# Patient Record
Sex: FEMALE | Race: WHITE | NOT HISPANIC OR LATINO | Employment: UNEMPLOYED | URBAN - NONMETROPOLITAN AREA
[De-identification: names, ages, dates, MRNs, and addresses within clinical notes are randomized per-mention and may not be internally consistent; named-entity substitution may affect disease eponyms.]

---

## 2021-07-12 ENCOUNTER — HOSPITAL ENCOUNTER (EMERGENCY)
Facility: HOSPITAL | Age: 26
Discharge: HOME | End: 2021-07-12
Attending: EMERGENCY MEDICINE | Admitting: EMERGENCY MEDICINE
Payer: COMMERCIAL

## 2021-07-12 ENCOUNTER — APPOINTMENT (EMERGENCY)
Dept: RADIOLOGY | Facility: HOSPITAL | Age: 26
End: 2021-07-12
Payer: COMMERCIAL

## 2021-07-12 VITALS
SYSTOLIC BLOOD PRESSURE: 109 MMHG | WEIGHT: 145.94 LBS | OXYGEN SATURATION: 98 % | RESPIRATION RATE: 18 BRPM | BODY MASS INDEX: 23.46 KG/M2 | HEART RATE: 74 BPM | DIASTOLIC BLOOD PRESSURE: 65 MMHG | TEMPERATURE: 98 F | HEIGHT: 66 IN

## 2021-07-12 DIAGNOSIS — F45.8 ANXIETY HYPERVENTILATION: Primary | ICD-10-CM

## 2021-07-12 DIAGNOSIS — F41.9 ANXIETY HYPERVENTILATION: Primary | ICD-10-CM

## 2021-07-12 DIAGNOSIS — R00.2 PALPITATIONS: ICD-10-CM

## 2021-07-12 LAB
ATRIAL RATE: 81 BPM
P AXIS: 75 DEGREES
PR INTERVAL: 120 MS
QRS AXIS: 100 DEGREES
QRSD INTERVAL: 96 MS
QT INTERVAL: 372 MS
QTC INTERVAL: 432 MS
T WAVE AXIS: 70 DEGREES
VENTRICULAR RATE: 81 BPM

## 2021-07-12 PROCEDURE — 93005 ELECTROCARDIOGRAM TRACING: CPT

## 2021-07-12 PROCEDURE — 99285 EMERGENCY DEPT VISIT HI MDM: CPT

## 2021-07-12 PROCEDURE — 71045 X-RAY EXAM CHEST 1 VIEW: CPT

## 2021-07-12 PROCEDURE — 99284 EMERGENCY DEPT VISIT MOD MDM: CPT | Performed by: EMERGENCY MEDICINE

## 2021-07-12 RX ORDER — ALPRAZOLAM 0.5 MG/1
0.5 TABLET ORAL 3 TIMES DAILY PRN
Qty: 15 TABLET | Refills: 0 | Status: SHIPPED | OUTPATIENT
Start: 2021-07-12

## 2021-07-12 RX ORDER — ALPRAZOLAM 0.5 MG/1
0.5 TABLET ORAL ONCE
Status: COMPLETED | OUTPATIENT
Start: 2021-07-12 | End: 2021-07-12

## 2021-07-12 RX ORDER — NORETHINDRONE ACETATE AND ETHINYL ESTRADIOL 1MG-20(21)
1 KIT ORAL DAILY
COMMUNITY
Start: 2021-03-15 | End: 2022-03-15

## 2021-07-12 RX ADMIN — ALPRAZOLAM 0.5 MG: 0.5 TABLET ORAL at 01:24

## 2021-07-12 NOTE — ED PROVIDER NOTES
History  Chief Complaint   Patient presents with    Palpitations     Patient reports palpitations/SOB waking her from sleep  Also reports feeling anxious  Patient is a 82-year-old female presents emergency department complaining of shortness of breath and palpitations which she woke from sleep with she reports this happened to her last night as well she woke up feeling short of breath and having heart racing and feeling very anxious patient does have a history of anxiety she believes she may be having a panic attack but does not have prior history of panic attacks  Patient is very anxious and tearful  History provided by:  Patient and parent  Palpitations  Palpitations quality:  Fast  Onset quality:  Sudden  Duration:  1 hour  Timing:  Constant  Progression:  Worsening  Chronicity:  Recurrent  Context: anxiety    Associated symptoms: shortness of breath    Associated symptoms: no chest pain, no cough, no dizziness, no nausea, no numbness, no vomiting and no weakness        Prior to Admission Medications   Prescriptions Last Dose Informant Patient Reported? Taking?   norethindrone-ethinyl estradiol (JUNEL FE 1/20) 1-20 MG-MCG per tablet 7/11/2021 at Unknown time  Yes Yes   Sig: Take 1 tablet by mouth daily      Facility-Administered Medications: None       History reviewed  No pertinent past medical history  History reviewed  No pertinent surgical history  History reviewed  No pertinent family history  I have reviewed and agree with the history as documented  E-Cigarette/Vaping    E-Cigarette Use Never User      E-Cigarette/Vaping Substances     Social History     Tobacco Use    Smoking status: Never Smoker    Smokeless tobacco: Never Used   Vaping Use    Vaping Use: Never used   Substance Use Topics    Alcohol use: Not Currently     Comment: Social    Drug use: Never       Review of Systems   Constitutional: Negative for activity change, appetite change, chills, fatigue and fever  HENT: Negative for congestion, ear pain, rhinorrhea and sore throat  Eyes: Negative for discharge, redness and visual disturbance  Respiratory: Positive for shortness of breath  Negative for cough, chest tightness and wheezing  Cardiovascular: Positive for palpitations  Negative for chest pain  Gastrointestinal: Negative for abdominal pain, constipation, diarrhea, nausea and vomiting  Endocrine: Negative for polydipsia and polyuria  Genitourinary: Negative for difficulty urinating, dysuria, frequency, hematuria and urgency  Musculoskeletal: Negative for arthralgias and myalgias  Skin: Negative for color change, pallor and rash  Neurological: Negative for dizziness, weakness, light-headedness, numbness and headaches  Hematological: Negative for adenopathy  Does not bruise/bleed easily  Psychiatric/Behavioral: The patient is nervous/anxious  All other systems reviewed and are negative  Physical Exam  Physical Exam  Vitals and nursing note reviewed  Constitutional:       Appearance: She is well-developed  HENT:      Head: Normocephalic and atraumatic  Right Ear: External ear normal       Left Ear: External ear normal       Nose: Nose normal    Eyes:      Conjunctiva/sclera: Conjunctivae normal       Pupils: Pupils are equal, round, and reactive to light  Cardiovascular:      Rate and Rhythm: Normal rate and regular rhythm  Heart sounds: Normal heart sounds  Pulmonary:      Effort: Pulmonary effort is normal  No respiratory distress  Breath sounds: Normal breath sounds  No wheezing or rales  Chest:      Chest wall: No tenderness  Abdominal:      General: Bowel sounds are normal  There is no distension  Palpations: Abdomen is soft  Tenderness: There is no abdominal tenderness  There is no guarding  Musculoskeletal:         General: Normal range of motion  Cervical back: Normal range of motion and neck supple     Skin:     General: Skin is warm and dry  Neurological:      Mental Status: She is alert and oriented to person, place, and time  Cranial Nerves: No cranial nerve deficit  Sensory: No sensory deficit  Vital Signs  ED Triage Vitals [07/12/21 0115]   Temperature Pulse Respirations Blood Pressure SpO2   98 °F (36 7 °C) 94 20 148/90 100 %      Temp Source Heart Rate Source Patient Position - Orthostatic VS BP Location FiO2 (%)   Temporal Monitor Lying Right arm --      Pain Score       --           Vitals:    07/12/21 0115 07/12/21 0130 07/12/21 0200   BP: 148/90 122/75 109/65   Pulse: 94 79 74   Patient Position - Orthostatic VS: Lying           Visual Acuity      ED Medications  Medications   ALPRAZolam (XANAX) tablet 0 5 mg (0 5 mg Oral Given 7/12/21 0124)       Diagnostic Studies  Results Reviewed     None                 XR chest 1 view portable   ED Interpretation by Kaitlynn Whitaker DO (07/12 0151)   No acute cardiopulmonary disease                 Procedures  ECG 12 Lead Documentation Only    Date/Time: 7/12/2021 1:29 AM  Performed by: Kaitlynn Whitaker DO  Authorized by:  Kaitlynn Whitaker DO     ECG reviewed by me, the ED Provider: yes    Patient location:  ED  Previous ECG:     Comparison to cardiac monitor: Yes    Quality:     Tracing quality:  Limited by artifact  Rate:     ECG rate:  81    ECG rate assessment: normal    Rhythm:     Rhythm: sinus rhythm    QRS:     QRS axis:  Right    QRS intervals:  Normal  Conduction:     Conduction: normal    ST segments:     ST segments:  Normal  T waves:     T waves: normal               ED Course                     PERC Rule for PE      Most Recent Value   PERC Rule for PE   Age >=50  0 Filed at: 07/12/2021 0129   HR >=100  0 Filed at: 07/12/2021 0129   O2 Sat on room air < 95%  0 Filed at: 07/12/2021 0129   History of PE or DVT  0 Filed at: 07/12/2021 0129   Recent trauma or surgery  0 Filed at: 07/12/2021 0129   Hemoptysis  0 Filed at: 07/12/2021 0129   Exogenous estrogen  0 Filed at: 07/12/2021 0129   Unilateral leg swelling  0 Filed at: 07/12/2021 0129   PERC Rule for PE Results  0 Filed at: 07/12/2021 5435                  Wells' Criteria for PE      Most Recent Value   Wells' Criteria for PE   Clinical signs and symptoms of DVT  0 Filed at: 07/12/2021 0604   PE is primary diagnosis or equally likely  0 Filed at: 07/12/2021 0129   HR >100  0 Filed at: 07/12/2021 0129   Immobilization at least 3 days or Surgery in the previous 4 weeks  0 Filed at: 07/12/2021 0129   Previous, objectively diagnosed PE or DVT  0 Filed at: 07/12/2021 0129   Hemoptysis  0 Filed at: 07/12/2021 0129   Malignancy with treatment within 6 months or palliative  0 Filed at: 07/12/2021 6294   Wells' Criteria Total  0 Filed at: 07/12/2021 0129                MDM  Number of Diagnoses or Management Options  Anxiety hyperventilation: new and requires workup  Palpitations: new and requires workup  Diagnosis management comments: Patient feels improved after rest and Xanax given in the emergency department she remained clinically and hemodynamically stable chest x-ray and EKG are unremarkable symptoms consistent with acute anxiety hyperventilation discussed measures to cope with anxiety and reduced panic attacks offered prescription for Xanax as needed for severe anxiety and panic in the future and discussed risks and precautions with this and how and when to use this medication  Advised prompt follow-up with primary physician for further evaluation and treatment return precautions and anticipatory guidance discussed           Amount and/or Complexity of Data Reviewed  Tests in the radiology section of CPT®: ordered and reviewed  Tests in the medicine section of CPT®: reviewed and ordered  Decide to obtain previous medical records or to obtain history from someone other than the patient: yes  Review and summarize past medical records: yes  Independent visualization of images, tracings, or specimens: yes    Risk of Complications, Morbidity, and/or Mortality  Presenting problems: low  Diagnostic procedures: low  Management options: low    Patient Progress  Patient progress: stable      Disposition  Final diagnoses:   Anxiety hyperventilation   Palpitations     Time reflects when diagnosis was documented in both MDM as applicable and the Disposition within this note     Time User Action Codes Description Comment    7/12/2021  2:10 AM Daryel [place] Add [F41 9,  F45 8] Anxiety hyperventilation     7/12/2021  2:10 AM Daryel [place] Add [R00 2] Palpitations       ED Disposition     ED Disposition Condition Date/Time Comment    Discharge Stable Mon Jul 12, 2021  2:10 AM Elvia Pittman discharge to home/self care  Follow-up Information     Follow up With Specialties Details Why Contact Info      Schedule an appointment as soon as possible for a visit in 3 days  Your primary care physician          Patient's Medications   Discharge Prescriptions    ALPRAZOLAM (XANAX) 0 5 MG TABLET    Take 1 tablet (0 5 mg total) by mouth 3 (three) times a day as needed for anxiety or sleep for up to 15 doses       Start Date: 7/12/2021 End Date: --       Order Dose: 0 5 mg       Quantity: 15 tablet    Refills: 0     No discharge procedures on file      PDMP Review     None          ED Provider  Electronically Signed by           Isidroleanne Miller DO  07/12/21 9045

## 2023-04-25 ENCOUNTER — OFFICE VISIT (OUTPATIENT)
Dept: URGENT CARE | Facility: CLINIC | Age: 28
End: 2023-04-25

## 2023-04-25 VITALS
HEIGHT: 65 IN | SYSTOLIC BLOOD PRESSURE: 128 MMHG | TEMPERATURE: 97.8 F | HEART RATE: 65 BPM | DIASTOLIC BLOOD PRESSURE: 70 MMHG | BODY MASS INDEX: 23.32 KG/M2 | OXYGEN SATURATION: 100 % | WEIGHT: 140 LBS | RESPIRATION RATE: 18 BRPM

## 2023-04-25 DIAGNOSIS — J02.9 VIRAL PHARYNGITIS: Primary | ICD-10-CM

## 2023-04-25 LAB — S PYO AG THROAT QL: NEGATIVE

## 2023-04-25 RX ORDER — DROSPIRENONE AND ETHINYL ESTRADIOL 0.02-3(28)
1 KIT ORAL DAILY
COMMUNITY
Start: 2023-01-06 | End: 2024-01-06

## 2023-04-25 NOTE — PROGRESS NOTES
[facility]        NAME: Yesica Benedict is a 24 y o  female  : 1995    MRN: [id number]  DATE: 2023  TIME: 10:31 AM    Assessment and Plan   Viral pharyngitis [J02 9]  1  Viral pharyngitis  POCT rapid strepA    Throat culture            Patient Instructions   Drink plenty of fluids  May use over the counter cold medications for symptomatic treatment  Do not use medications with Pseudoephedrine or Phenylphrine if you have high blood pressure because it may worsen your blood pressure  Follow up with your PCP in 3-5 days if your symptoms do not improve or if you have any concerns  Go to the ER if symptoms become severe  Follow up with PCP in 3-5 days  Proceed to  ER if symptoms worsen  Chief Complaint     Chief Complaint   Patient presents with   • Sore Throat     Sore throat, cough, swollen lymph nodes x 1 day          History of Present Illness       Patient is 22-year-old female with no severe past medical history presents the office complaining of sore throat, swollen lymph nodes, and cough for 1 day  Pain is rated 7 out of 10  Denies fevers, chills, congestion, chest pain, shortness breath, nausea, vomiting, abdominal pain, or rashes  Reports possible exposure to strep last week  Review of Systems   Review of Systems   Constitutional: Negative for chills and fever  HENT: Positive for sore throat  Negative for congestion  Respiratory: Positive for cough  Negative for shortness of breath  Cardiovascular: Negative for chest pain and palpitations  Gastrointestinal: Negative for abdominal pain, diarrhea, nausea and vomiting  Skin: Negative for rash  Neurological: Negative for dizziness, light-headedness and headaches           Current Medications       Current Outpatient Medications:   •  ALPRAZolam (XANAX) 0 5 mg tablet, Take 1 tablet (0 5 mg total) by mouth 3 (three) times a day as needed for anxiety or sleep for up to 15 doses, Disp: 15 tablet, Rfl: 0  • "drospirenone-ethinyl estradiol (ALEX) 3-0 02 MG per tablet, Take 1 tablet by mouth daily, Disp: , Rfl:   •  norethindrone-ethinyl estradiol (JUNEL FE 1/20) 1-20 MG-MCG per tablet, Take 1 tablet by mouth daily, Disp: , Rfl:     Current Allergies     Allergies as of 04/25/2023   • (No Known Allergies)            The following portions of the patient's history were reviewed and updated as appropriate: allergies, current medications, past family history, past medical history, past social history, past surgical history and problem list      Past Medical History:   Diagnosis Date   • Anxiety        Past Surgical History:   Procedure Laterality Date   • NO PAST SURGERIES         History reviewed  No pertinent family history  Medications have been verified  Objective   /70   Pulse 65   Temp 97 8 °F (36 6 °C) (Tympanic)   Resp 18   Ht 5' 5\" (1 651 m)   Wt 63 5 kg (140 lb)   LMP 03/25/2023   SpO2 100%   BMI 23 30 kg/m²   Patient's last menstrual period was 03/25/2023  Physical Exam     Physical Exam  Vitals and nursing note reviewed  Constitutional:       Appearance: Normal appearance  She is well-developed  HENT:      Head: Normocephalic and atraumatic  Right Ear: Tympanic membrane, ear canal and external ear normal       Left Ear: Tympanic membrane, ear canal and external ear normal       Nose: Nose normal       Mouth/Throat:      Mouth: Mucous membranes are moist       Pharynx: Oropharynx is clear  Uvula midline  No pharyngeal swelling or posterior oropharyngeal erythema  Tonsils: No tonsillar exudate or tonsillar abscesses  Eyes:      General: Lids are normal       Conjunctiva/sclera: Conjunctivae normal       Pupils: Pupils are equal, round, and reactive to light  Cardiovascular:      Rate and Rhythm: Normal rate and regular rhythm  Pulses: Normal pulses  Heart sounds: Normal heart sounds  No murmur heard  No friction rub  No gallop     Pulmonary:      Effort: " Pulmonary effort is normal       Breath sounds: Normal breath sounds  No wheezing, rhonchi or rales  Musculoskeletal:         General: Normal range of motion  Cervical back: Neck supple  Lymphadenopathy:      Cervical: Cervical adenopathy present  Skin:     General: Skin is warm and dry  Capillary Refill: Capillary refill takes less than 2 seconds  Neurological:      Mental Status: She is alert         POC rapid strep negative

## 2023-04-27 LAB — BACTERIA THROAT CULT: NORMAL
